# Patient Record
Sex: FEMALE | Race: WHITE | Employment: FULL TIME | URBAN - METROPOLITAN AREA
[De-identification: names, ages, dates, MRNs, and addresses within clinical notes are randomized per-mention and may not be internally consistent; named-entity substitution may affect disease eponyms.]

---

## 2018-03-28 ENCOUNTER — RECORDS - HEALTHEAST (OUTPATIENT)
Dept: LAB | Facility: CLINIC | Age: 28
End: 2018-03-28

## 2018-04-02 LAB
MISCELLANEOUS TEST DEPT. - HE HISTORICAL: NORMAL
PERFORMING LAB: NORMAL
SPECIMEN STATUS: NORMAL
TEST NAME: NORMAL

## 2018-09-19 ENCOUNTER — RECORDS - HEALTHEAST (OUTPATIENT)
Dept: LAB | Facility: CLINIC | Age: 28
End: 2018-09-19

## 2018-09-20 LAB — BACTERIA SPEC CULT: NO GROWTH

## 2019-09-12 ENCOUNTER — RECORDS - HEALTHEAST (OUTPATIENT)
Dept: LAB | Facility: CLINIC | Age: 29
End: 2019-09-12

## 2019-09-12 LAB
FERRITIN SERPL-MCNC: 49 NG/ML (ref 10–130)
VIT B12 SERPL-MCNC: 339 PG/ML (ref 213–816)

## 2019-10-30 ENCOUNTER — THERAPY VISIT (OUTPATIENT)
Dept: PHYSICAL THERAPY | Facility: CLINIC | Age: 29
End: 2019-10-30
Payer: COMMERCIAL

## 2019-10-30 DIAGNOSIS — M99.05 SOMATIC DYSFUNCTION OF PELVIC REGION: ICD-10-CM

## 2019-10-30 DIAGNOSIS — R39.15 URGENCY OF URINATION: ICD-10-CM

## 2019-10-30 PROCEDURE — 97161 PT EVAL LOW COMPLEX 20 MIN: CPT | Mod: GP | Performed by: PHYSICAL THERAPIST

## 2019-10-30 PROCEDURE — 97112 NEUROMUSCULAR REEDUCATION: CPT | Mod: GP | Performed by: PHYSICAL THERAPIST

## 2019-10-30 PROCEDURE — 97530 THERAPEUTIC ACTIVITIES: CPT | Mod: GP | Performed by: PHYSICAL THERAPIST

## 2019-10-30 NOTE — LETTER
[facility] [place] [id number] [facility] [address]  [phone]    2019    Re: Ivanna Pittman   :   1990  MRN:  [id number]   REFERRING PHYSICIAN:   Shauna Gilmore    [facility]  Date of Initial Evaluation:  10/30/19  Visits:  Rxs Used: 1  Reason for Referral:     Somatic dysfunction of pelvic region  Urgency of urination    Physical Therapy Initial Examination/Evaluation 2019   Ivanna Pittman is a 29 year old female referred to physical therapy by Dr. Shauna Gilmore for treatment of biofeedback for urinary frequency  with Precautions/Restrictions/MD instructions E&T    Therapist Assessment:   Clinical Impression: Pt presents to [facility] with primary complaint of urgency.  Per clinical examination, pt with increased tone in pelvic floor, making muscle control difficult.  Increased time needed for relaxation. Pt will benefit from skilled physical therapy for relaxation vs stretching program to improve neuromuscular control of PF muscles. She will also benefit from education on fluid intake and urination frequency for improved bladder health.    Subjective: Pt reports symptoms have been present for 1 year. She was on a trip with her boyfriend to [place] and urgency started. She does not like to fly. Symptoms are worse when she was in places without a bathroom. She can only hold urine for 10-15 minutes at time. She has never had incontinence but urgency is frequent. She did a water challenge at work, where she was drinking 64 ounces; urgency continued but she was actually able to urinate.  Right now, she will sit to go when she has urgency but doesn't have anything in bladder. Pt's mother told her that she used to wait 6+ hours between urination when she ws a kid.   DOI/onset: MD order: 2019   Mechanism of injury: NA   DOS NA   Related PMH: used to hold urine for long periods of time  Previous  treatment: NA   Imaging: NA   Chief Complaint: urgency    Re: Ivanna Pittman   :   1990    Pain: rest 0 /10, activity 0/10  Described as:  Alleviated by: sitting  Exacerbated by: being In places without a bathroom, if she is nervous, long meetings, talking/thinking about urination   Progression of symptoms since initial onset: worsened  Time of day when pain is worse: during the day   Sleepinx-needs to go urgently go upon waking    Social history: lives with boyfriend    Occupation: Analyst Job duties: computer work, prolonged sitting; 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 Yes    Do you leak with cough,sneeze, jumping, running?No   Any other activities that cause leaking? No   Do you have triggers that make you feel you can't wait to go to the bathroom? Yes   what are they: movement, being at a place that doesn't have a bathroom   Type of pad and number used per day? NA  When you leak what is the amount? NA  How long can you delay the need to urinate? 5-10 minutes   How many times do you get up to urinate at night? 1    Can you stop the flow of urine when on the toilet? Yes  Is the volume of urine passed usually: Sm-Med. (8sec rule=  250ml with average bladder storing  400-600ml)  Do you strain to pass urine? Sometimes if she hasn't drank anything   Do you have a slow or hesitant urinary stream? Yes  Do you have difficulty initiating the urine stream? Sometimes if hasn't drank anything  Is urination painful?  No  How many bladder infections have you had in last 12 months? 2 UTIs  Fluid  intake(one glass is 8oz or one cup) 4 glasses/day, NAcaffinated glasses/day  NA alcohol glasses/day.    Bowel habits:  Frequency of bowel movements? No issues  Do you ignore the urge to defecate? No  Do you strain to pass stool? Not asked      Re: Ivanna Pittman   :   1990    Pelvic Pain:  Do you have any pelvic pain with intercourse, exams, use of tampons? No  Is initial penetration during intercourse painful? No  Is deeper penetration painful? No  Given birth? No   Are you sexually active?Yes  Have you ever been worried for your physical safety? No  Any abdominal or pelvic surgeries?  No  Are you having any regular exercise? Yes, see above   Have you practiced the PF(kegel) exercises for 4 or more weeks? No    MUSCLE PERFORMANCE  Baseline PF tone:hyper  PF Tone with cough: hyper  Valsalva: not tested  PF Response quality: moderate  PF Power: Center: 2   Endurance: Maximum contraction in seconds: 8-10 seconds  # of endurance contractions before fatigue: NT  Quick contraction repetitions prior to fatigue: 4.  Specificity/accessory muscles: Some glutes     Hip MMT 10/30/2019  Left Right    Hip Flexion  4+/5 4+/5   Hip Abduction  4-/5 4-/5   Hip Extension  4-/5 4-/5     PALPATION: Some pain with palpation on the L   NMR (15 mins)   Biofeedback unit used to provide visualization of PF activation in supine. Education provided on tone of pelvic floor and how this can affect symptoms. Pt working on 2-5s contractions, 10 second relax with manual PT cues,  5 reps 2x/day. Manual and visual cues provided for relaxation.  Pt provided with HEP.  Therapeutic Activity (20 min): Today's session consisted of education regarding pelvic floor muscle anatomy, normal bladder function, urge suppression techniques and/or relaxation techniques as indicated, and instruction in how to complete a bladder diary for assessment next visit.  Pt was instructed in the pathoanatomy of the pelvic floor utilizing pelvic model.  We discussed  what pelvic floor physical therapy is, components of exam, and typical patient progression.  Pt was told that she was in control of exam progression, and if at any time was uncomfortable and wished to discontinue we could.     Assessment/Plan:    Patient is a 29 year old female with pelvic complaints.    Patient has the following significant findings with corresponding treatment plan.                Diagnosis 1:  Urgency  Pain -  manual therapy, self management, education and home program  Decreased ROM/flexibility - manual therapy, therapeutic exercise, therapeutic activity and home program    Re: Ivanna Pittman   :   1990      Impaired muscle performance - biofeedback, electric stimulation, neuro re-education and home program  Decreased function - therapeutic activities and home program    Therapy Evaluation Codes:   1) History comprised of:   Personal factors that impact the plan of care:      Past/current experiences and Time since onset of symptoms.    Comorbidity factors that impact the plan of care are:      Migraines/headaches.     Medications impacting care: None.  2) Examination of Body Systems comprised of:   Body structures and functions that impact the plan of care:      Pelvis.   Activity limitations that impact the plan of care are:      Frequency and Urgency.  3) Clinical presentation characteristics are:   Stable/Uncomplicated.  4) Decision-Making    Low complexity using standardized patient assessment instrument and/or measureable assessment of functional outcome.  Cumulative Therapy Evaluation is: Low complexity.  Previous and current functional limitations:  (See Goal Flow Sheet for this information)    Short term and Long term goals: (See Goal Flow Sheet for this information)   Communication ability:  Patient appears to be able to clearly communicate and understand verbal and written communication and follow directions correctly.  Treatment Explanation - The following has been discussed  with the patient:   RX ordered/plan of care  Anticipated outcomes  Possible risks and side effects  This patient would benefit from PT intervention to resume normal activities.   Rehab potential is good.  Frequency:  1 X week, once daily  Duration:  for 6 weeks  Discharge Plan:  Achieve all LTG.  Independent in home treatment program.  Reach maximal therapeutic benefit.    Thank you for your referral.    INQUIRIES  Therapist: Shelley Gutierrez   [facility] [address] [address]  Phone: [phone]  Fax: [phone]

## 2019-10-30 NOTE — PROGRESS NOTES
[facility]  System  Physical Exam  General   [facility]     Physical Therapy Initial Examination/Evaluation 2019   Ivanna Pittman is a 29 year old female referred to physical therapy by Dr. Shauna Gilmore for treatment of biofeedback for urinary frequency  with Precautions/Restrictions/MD instructions E&T    Therapist Assessment:   Clinical Impression: Pt presents to [facility] with primary complaint of urgency.  Per clinical examination, pt with increased tone in pelvic floor, making muscle control difficult.  Increased time needed for relaxation. Pt will benefit from skilled physical therapy for relaxation vs stretching program to improve neuromuscular control of PF muscles. She will also benefit from education on fluid intake and urination frequency for improved bladder health.       Subjective: Pt reports symptoms have been present for 1 year. She was on a trip with her boyfriend to [place] and urgency started. She does not like to fly. Symptoms are worse when she was in places without a bathroom. She can only hold urine for 10-15 minutes at time. She has never had incontinence but urgency is frequent. She did a water challenge at work, where she was drinking 64 ounces; urgency continued but she was actually able to urinate.  Right now, she will sit to go when she has urgency but doesn't have anything in bladder. Pt's mother told her that she used to wait 6+ hours between urination when she ws a kid.   DOI/onset: MD order: 2019   Mechanism of injury: NA   DOS NA   Related PMH: used to hold urine for long periods of time  Previous treatment: NA   Imaging: NA   Chief Complaint: urgency    Pain: rest 0 /10, activity 0/10  Described as:  Alleviated by: sitting  Exacerbated by: being In places without a bathroom, if she is nervous, long meetings, talking/thinking about urination   Progression of symptoms since initial onset: worsened  Time of day when pain is worse: during the day   Sleepinx-needs  to go urgently go upon waking    Social history: lives with boyfriend    Occupation: Analyst Job duties: computer work, prolonged sitting; 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 Yes    Do you leak with cough,sneeze, jumping, running?No   Any other activities that cause leaking? No   Do you have triggers that make you feel you can't wait to go to the bathroom? Yes   what are they: movement, being at a place that doesn't have a bathroom   Type of pad and number used per day? NA  When you leak what is the amount? NA    How long can you delay the need to urinate? 5-10 minutes   How many times do you get up to urinate at night? 1    Can you stop the flow of urine when on the toilet? Yes  Is the volume of urine passed usually: Sm-Med. (8sec rule=  250ml with average bladder storing  400-600ml)    Do you strain to pass urine? Sometimes if she hasn't drank anything   Do you have a slow or hesitant urinary stream? Yes  Do you have difficulty initiating the urine stream? Sometimes if hasn't drank anything  Is urination painful?  No    How many bladder infections have you had in last 12 months? 2 UTIs    Fluid intake(one glass is 8oz or one cup) 4 glasses/day, NAcaffinated glasses/day  NA alcohol glasses/day.    Bowel habits:  Frequency of bowel movements? No issues    Do you ignore the urge to defecate? No  Do you strain to pass stool? Not asked    Pelvic Pain:  Do you have any pelvic pain with intercourse, exams, use of tampons? No  Is initial penetration during intercourse painful? No  Is deeper penetration  painful? No        Given birth? No     Are you sexually active?Yes  Have you ever been worried for your physical safety? No  Any abdominal or pelvic surgeries?  No  Are you having any regular exercise? Yes, see above   Have you practiced the PF(kegel) exercises for 4 or more weeks? No      MUSCLE PERFORMANCE    Baseline PF tone:hyper  PF Tone with cough: hyper  Valsalva: not tested  PF Response quality: moderate  PF Power: Center: 2   Endurance: Maximum contraction in seconds: 8-10 seconds  # of endurance contractions before fatigue: NT  Quick contraction repetitions prior to fatigue: 4.  Specificity/accessory muscles: Some glutes       Hip MMT 10/30/2019  Left Right    Hip Flexion  4+/5 4+/5   Hip Abduction  4-/5 4-/5   Hip Extension  4-/5 4-/5         PALPATION: Some pain with palpation on the L       NMR (15 mins)   Biofeedback unit used to provide visualization of PF activation in supine. Education provided on tone of pelvic floor and how this can affect symptoms. Pt working on 2-5s contractions, 10 second relax with manual PT cues,  5 reps 2x/day. Manual and visual cues provided for relaxation.  Pt provided with HEP.    Therapeutic Activity (20 min): Today's session consisted of education regarding pelvic floor muscle anatomy, normal bladder function, urge suppression techniques and/or relaxation techniques as indicated, and instruction in how to complete a bladder diary for assessment next visit.  Pt was instructed in the pathoanatomy of the pelvic floor utilizing pelvic model.  We discussed what pelvic floor physical therapy is, components of exam, and typical patient progression.  Pt was told that she was in control of exam progression, and if at any time was uncomfortable and wished to discontinue we could.       Assessment/Plan:    Patient is a 29 year old female with pelvic complaints.    Patient has the following significant findings with corresponding treatment plan.                Diagnosis 1:  Urgency   Pain -  manual therapy, self management, education and home program  Decreased ROM/flexibility - manual therapy, therapeutic exercise, therapeutic activity and home program  Impaired muscle performance - biofeedback, electric stimulation, neuro re-education and home program  Decreased function - therapeutic activities and home program    Therapy Evaluation Codes:   1) History comprised of:   Personal factors that impact the plan of care:      Past/current experiences and Time since onset of symptoms.    Comorbidity factors that impact the plan of care are:      Migraines/headaches.     Medications impacting care: None.  2) Examination of Body Systems comprised of:   Body structures and functions that impact the plan of care:      Pelvis.   Activity limitations that impact the plan of care are:      Frequency and Urgency.  3) Clinical presentation characteristics are:   Stable/Uncomplicated.  4) Decision-Making    Low complexity using standardized patient assessment instrument and/or measureable assessment of functional outcome.  Cumulative Therapy Evaluation is: Low complexity.    Previous and current functional limitations:  (See Goal Flow Sheet for this information)    Short term and Long term goals: (See Goal Flow Sheet for this information)     Communication ability:  Patient appears to be able to clearly communicate and understand verbal and written communication and follow directions correctly.  Treatment Explanation - The following has been discussed with the patient:   RX ordered/plan of care  Anticipated outcomes  Possible risks and side effects  This patient would benefit from PT intervention to resume normal activities.   Rehab potential is good.    Frequency:  1 X week, once daily  Duration:  for 6 weeks  Discharge Plan:  Achieve all LTG.  Independent in home treatment program.  Reach maximal therapeutic benefit.    Please refer to the daily flowsheet for treatment today, total treatment time and time  spent performing 1:1 timed codes.

## 2019-11-06 ENCOUNTER — THERAPY VISIT (OUTPATIENT)
Dept: PHYSICAL THERAPY | Facility: CLINIC | Age: 29
End: 2019-11-06
Payer: COMMERCIAL

## 2019-11-06 DIAGNOSIS — M99.05 SOMATIC DYSFUNCTION OF PELVIC REGION: ICD-10-CM

## 2019-11-06 DIAGNOSIS — R39.15 URGENCY OF URINATION: ICD-10-CM

## 2019-11-06 PROCEDURE — 97110 THERAPEUTIC EXERCISES: CPT | Mod: GP | Performed by: PHYSICAL THERAPIST

## 2019-11-06 PROCEDURE — 97112 NEUROMUSCULAR REEDUCATION: CPT | Mod: GP | Performed by: PHYSICAL THERAPIST

## 2019-11-06 PROCEDURE — 97530 THERAPEUTIC ACTIVITIES: CPT | Mod: GP | Performed by: PHYSICAL THERAPIST

## 2019-11-13 ENCOUNTER — THERAPY VISIT (OUTPATIENT)
Dept: PHYSICAL THERAPY | Facility: CLINIC | Age: 29
End: 2019-11-13
Payer: COMMERCIAL

## 2019-11-13 DIAGNOSIS — M99.05 SOMATIC DYSFUNCTION OF PELVIC REGION: ICD-10-CM

## 2019-11-13 DIAGNOSIS — R39.15 URGENCY OF URINATION: ICD-10-CM

## 2019-11-13 PROCEDURE — 97110 THERAPEUTIC EXERCISES: CPT | Mod: GP | Performed by: PHYSICAL THERAPIST

## 2019-11-13 PROCEDURE — 97140 MANUAL THERAPY 1/> REGIONS: CPT | Mod: GP | Performed by: PHYSICAL THERAPIST

## 2020-04-16 PROBLEM — R39.15 URGENCY OF URINATION: Status: RESOLVED | Noted: 2019-10-30 | Resolved: 2020-04-16

## 2020-04-16 PROBLEM — M99.05 SOMATIC DYSFUNCTION OF PELVIC REGION: Status: RESOLVED | Noted: 2019-10-30 | Resolved: 2020-04-16

## 2020-04-16 NOTE — PROGRESS NOTES
Discharge Note    Progress reporting period is from Oct 30, 2019 to Nov 13, 2019.     Ivanna failed to return for next follow up visit and current status is unknown.  Please see information below for last relevant information on current status.  Patient seen for Rxs Used: 3 visits.  SUBJECTIVE  Subjective changes noted by patient:  Subjective: Patient reports that PF contractions continue to give her urgency. She is making good progress towards goals and frequency of urination has improved. She was able to walk home from work the last 2 days without needing to stop.   .  Current pain level is  .     Previous pain level was  Initial Pain level: 0/10.   Changes in function:  Yes (See Goal flowsheet attached for changes in current functional level)  Adverse reaction to treatment or activity: None    OBJECTIVE  Changes noted in objective findings: Objective: Holding on PF contractions, initiated stretching possibiltiy of use of home dilators, progressed HEP     ASSESSMENT/PLAN  Diagnosis: Urgency of urination    Updated problem list and treatment plan:   Decreased ROM/flexibility - HEP  Decreased function - HEP  Impaired muscle performance - HEP  STG/LTGs have been met or progress has been made towards goals:  Yes, please see goal flowsheet for most current information  Assessment of Progress: current status is unknown.  Last current status: Assessment of progress: Pt is progressing as expected   Self Management Plans:  HEP  I have re-evaluated this patient and find that the nature, scope, duration and intensity of the therapy is appropriate for the medical condition of the patient.  Ivanna continues to require the following intervention to meet STG and LTG's:  HEP.    Recommendations:  Discharge with current home program.  Patient to follow up with MD as needed.    Please refer to the daily flowsheet for treatment today, total treatment time and time spent performing 1:1 timed codes.

## 2020-09-24 ENCOUNTER — RECORDS - HEALTHEAST (OUTPATIENT)
Dept: LAB | Facility: CLINIC | Age: 30
End: 2020-09-24

## 2020-09-24 LAB
CHOLEST SERPL-MCNC: 199 MG/DL
FASTING STATUS PATIENT QL REPORTED: NO
HDLC SERPL-MCNC: 64 MG/DL
LDLC SERPL CALC-MCNC: 120 MG/DL
TRIGL SERPL-MCNC: 73 MG/DL
TSH SERPL DL<=0.005 MIU/L-ACNC: 1.17 UIU/ML (ref 0.3–5)

## 2022-03-30 ENCOUNTER — LAB REQUISITION (OUTPATIENT)
Dept: LAB | Facility: CLINIC | Age: 32
End: 2022-03-30
Payer: COMMERCIAL

## 2022-03-30 DIAGNOSIS — Z83.49 FAMILY HISTORY OF OTHER ENDOCRINE, NUTRITIONAL AND METABOLIC DISEASES: ICD-10-CM

## 2022-03-30 DIAGNOSIS — Z13.220 ENCOUNTER FOR SCREENING FOR LIPOID DISORDERS: ICD-10-CM

## 2022-03-30 LAB
CHOLEST SERPL-MCNC: 202 MG/DL
FASTING STATUS PATIENT QL REPORTED: ABNORMAL
HDLC SERPL-MCNC: 60 MG/DL
LDLC SERPL CALC-MCNC: 125 MG/DL
TRIGL SERPL-MCNC: 85 MG/DL
TSH SERPL DL<=0.005 MIU/L-ACNC: 1.45 UIU/ML (ref 0.3–5)

## 2022-03-30 PROCEDURE — 84443 ASSAY THYROID STIM HORMONE: CPT | Mod: ORL | Performed by: NURSE PRACTITIONER

## 2022-03-30 PROCEDURE — 80061 LIPID PANEL: CPT | Mod: ORL | Performed by: NURSE PRACTITIONER

## 2022-08-17 ENCOUNTER — LAB REQUISITION (OUTPATIENT)
Dept: LAB | Facility: CLINIC | Age: 32
End: 2022-08-17
Payer: COMMERCIAL

## 2022-08-17 DIAGNOSIS — Z12.4 ENCOUNTER FOR SCREENING FOR MALIGNANT NEOPLASM OF CERVIX: ICD-10-CM

## 2022-08-17 PROCEDURE — G0145 SCR C/V CYTO,THINLAYER,RESCR: HCPCS | Mod: ORL | Performed by: OBSTETRICS & GYNECOLOGY

## 2022-08-22 LAB
BKR LAB AP GYN ADEQUACY: NORMAL
BKR LAB AP GYN INTERPRETATION: NORMAL
BKR LAB AP HPV REFLEX: NORMAL
BKR LAB AP LMP: NORMAL
BKR LAB AP PREVIOUS ABNL DX: NORMAL
BKR LAB AP PREVIOUS ABNORMAL: NORMAL
PATH REPORT.COMMENTS IMP SPEC: NORMAL
PATH REPORT.COMMENTS IMP SPEC: NORMAL
PATH REPORT.RELEVANT HX SPEC: NORMAL

## 2023-02-24 ENCOUNTER — LAB REQUISITION (OUTPATIENT)
Dept: LAB | Facility: CLINIC | Age: 33
End: 2023-02-24
Payer: COMMERCIAL

## 2023-02-24 DIAGNOSIS — Z87.410 PERSONAL HISTORY OF CERVICAL DYSPLASIA: ICD-10-CM

## 2023-02-24 PROCEDURE — 87624 HPV HI-RISK TYP POOLED RSLT: CPT | Mod: ORL | Performed by: OBSTETRICS & GYNECOLOGY

## 2023-02-24 PROCEDURE — G0145 SCR C/V CYTO,THINLAYER,RESCR: HCPCS | Mod: ORL | Performed by: OBSTETRICS & GYNECOLOGY

## 2023-03-02 LAB
HUMAN PAPILLOMA VIRUS 16 DNA: NEGATIVE
HUMAN PAPILLOMA VIRUS 18 DNA: NEGATIVE
HUMAN PAPILLOMA VIRUS FINAL DIAGNOSIS: NORMAL
HUMAN PAPILLOMA VIRUS OTHER HR: NEGATIVE

## 2024-04-02 ENCOUNTER — LAB REQUISITION (OUTPATIENT)
Dept: LAB | Facility: CLINIC | Age: 34
End: 2024-04-02

## 2024-04-02 DIAGNOSIS — Z83.49 FAMILY HISTORY OF OTHER ENDOCRINE, NUTRITIONAL AND METABOLIC DISEASES: ICD-10-CM

## 2024-04-02 LAB
T4 FREE SERPL-MCNC: 1.01 NG/DL (ref 0.9–1.7)
TSH SERPL DL<=0.005 MIU/L-ACNC: 1.53 UIU/ML (ref 0.3–4.2)

## 2024-04-02 PROCEDURE — 84443 ASSAY THYROID STIM HORMONE: CPT | Performed by: NURSE PRACTITIONER

## 2024-04-02 PROCEDURE — 84439 ASSAY OF FREE THYROXINE: CPT | Performed by: NURSE PRACTITIONER

## 2024-04-15 ENCOUNTER — HOSPITAL ENCOUNTER (OUTPATIENT)
Facility: CLINIC | Age: 34
Discharge: HOME | End: 2024-04-15
Admitting: OBSTETRICS & GYNECOLOGY
Payer: COMMERCIAL

## 2024-04-15 ENCOUNTER — LAB REQUISITION (OUTPATIENT)
Dept: LAB | Facility: CLINIC | Age: 34
End: 2024-04-15

## 2024-04-15 DIAGNOSIS — Z01.419 ENCOUNTER FOR GYNECOLOGICAL EXAMINATION (GENERAL) (ROUTINE) WITHOUT ABNORMAL FINDINGS: ICD-10-CM

## 2024-04-15 DIAGNOSIS — Z13.220 ENCOUNTER FOR SCREENING FOR LIPOID DISORDERS: ICD-10-CM

## 2024-04-15 DIAGNOSIS — Z13.1 ENCOUNTER FOR SCREENING FOR DIABETES MELLITUS: ICD-10-CM

## 2024-04-15 LAB — HBA1C MFR BLD: 5.3 %

## 2024-04-15 PROCEDURE — 82166 ASSAY ANTI-MULLERIAN HORM: CPT | Performed by: OBSTETRICS & GYNECOLOGY

## 2024-04-15 PROCEDURE — 82465 ASSAY BLD/SERUM CHOLESTEROL: CPT | Performed by: OBSTETRICS & GYNECOLOGY

## 2024-04-15 PROCEDURE — 83036 HEMOGLOBIN GLYCOSYLATED A1C: CPT | Performed by: OBSTETRICS & GYNECOLOGY

## 2024-04-16 LAB
CHOLEST SERPL-MCNC: 239 MG/DL
FASTING STATUS PATIENT QL REPORTED: NO
HDLC SERPL-MCNC: 88 MG/DL
LDLC SERPL CALC-MCNC: 135 MG/DL
MIS SERPL-MCNC: 1.43 NG/ML (ref 0.58–8.1)
NONHDLC SERPL-MCNC: 151 MG/DL
TRIGL SERPL-MCNC: 80 MG/DL

## 2024-07-23 ENCOUNTER — LAB REQUISITION (OUTPATIENT)
Dept: LAB | Facility: CLINIC | Age: 34
End: 2024-07-23
Payer: COMMERCIAL

## 2024-07-23 DIAGNOSIS — Z31.41 ENCOUNTER FOR FERTILITY TESTING: ICD-10-CM

## 2024-07-23 LAB
HBA1C MFR BLD: 5.3 %
HOLD SPECIMEN: NORMAL

## 2024-07-23 PROCEDURE — 84443 ASSAY THYROID STIM HORMONE: CPT | Performed by: NURSE PRACTITIONER

## 2024-07-23 PROCEDURE — 83001 ASSAY OF GONADOTROPIN (FSH): CPT | Performed by: NURSE PRACTITIONER

## 2024-07-23 PROCEDURE — 83002 ASSAY OF GONADOTROPIN (LH): CPT | Performed by: NURSE PRACTITIONER

## 2024-07-23 PROCEDURE — 82670 ASSAY OF TOTAL ESTRADIOL: CPT | Performed by: NURSE PRACTITIONER

## 2024-07-23 PROCEDURE — 83036 HEMOGLOBIN GLYCOSYLATED A1C: CPT | Performed by: NURSE PRACTITIONER

## 2024-07-24 LAB
ESTRADIOL SERPL-MCNC: 46 PG/ML
FSH SERPL IRP2-ACNC: 7.1 MIU/ML
LH SERPL-ACNC: 5.5 MIU/ML
TSH SERPL DL<=0.005 MIU/L-ACNC: 1.55 UIU/ML (ref 0.3–4.2)

## 2024-11-12 ENCOUNTER — LAB REQUISITION (OUTPATIENT)
Dept: LAB | Facility: CLINIC | Age: 34
End: 2024-11-12

## 2024-11-12 DIAGNOSIS — Z01.818 ENCOUNTER FOR OTHER PREPROCEDURAL EXAMINATION: ICD-10-CM

## 2024-11-12 PROCEDURE — 80048 BASIC METABOLIC PNL TOTAL CA: CPT

## 2024-11-13 LAB
ANION GAP SERPL CALCULATED.3IONS-SCNC: 12 MMOL/L (ref 7–15)
BUN SERPL-MCNC: 12.6 MG/DL (ref 6–20)
CALCIUM SERPL-MCNC: 9.9 MG/DL (ref 8.8–10.4)
CHLORIDE SERPL-SCNC: 102 MMOL/L (ref 98–107)
CREAT SERPL-MCNC: 0.76 MG/DL (ref 0.51–0.95)
EGFRCR SERPLBLD CKD-EPI 2021: >90 ML/MIN/1.73M2
GLUCOSE SERPL-MCNC: 80 MG/DL (ref 70–99)
HCO3 SERPL-SCNC: 22 MMOL/L (ref 22–29)
POTASSIUM SERPL-SCNC: 4.3 MMOL/L (ref 3.4–5.3)
SODIUM SERPL-SCNC: 136 MMOL/L (ref 135–145)